# Patient Record
Sex: FEMALE | Race: OTHER | HISPANIC OR LATINO | Employment: PART TIME | URBAN - METROPOLITAN AREA
[De-identification: names, ages, dates, MRNs, and addresses within clinical notes are randomized per-mention and may not be internally consistent; named-entity substitution may affect disease eponyms.]

---
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
## 2023-04-15 ENCOUNTER — HOSPITAL ENCOUNTER (EMERGENCY)
Facility: HOSPITAL | Age: 18
Discharge: HOME | End: 2023-04-15
Attending: EMERGENCY MEDICINE
Payer: MEDICAID

## 2023-04-15 VITALS
BODY MASS INDEX: 34.32 KG/M2 | HEART RATE: 98 BPM | HEIGHT: 65 IN | OXYGEN SATURATION: 99 % | SYSTOLIC BLOOD PRESSURE: 133 MMHG | TEMPERATURE: 98 F | RESPIRATION RATE: 18 BRPM | DIASTOLIC BLOOD PRESSURE: 76 MMHG | WEIGHT: 206 LBS

## 2023-04-15 DIAGNOSIS — N89.8 VAGINAL DISCHARGE: ICD-10-CM

## 2023-04-15 DIAGNOSIS — Z20.2 POSSIBLE EXPOSURE TO STD: Primary | ICD-10-CM

## 2023-04-15 LAB
APPEARANCE UR: CLEAR
B-HCG SERPL QL: NEGATIVE
BACTERIA #/AREA URNS AUTO: ABNORMAL /HPF
BILIRUB UR QL STRIP.AUTO: NEGATIVE MG/DL
C TRACH DNA SPEC QL NAA+PROBE: NOT DETECTED
CLUE CELLS VAG QL WET PREP: ABNORMAL
COLOR UR AUTO: YELLOW
GLUCOSE UR QL STRIP.AUTO: NEGATIVE MG/DL
KETONES UR QL STRIP.AUTO: NEGATIVE MG/DL
LEUKOCYTE ESTERASE UR QL STRIP.AUTO: ABNORMAL UNIT/L
N GONORRHOEA DNA SPEC QL NAA+PROBE: NOT DETECTED
NITRITE UR QL STRIP.AUTO: NEGATIVE
PH UR STRIP.AUTO: 6.5 [PH]
PROT UR QL STRIP.AUTO: NEGATIVE MG/DL
RBC #/AREA URNS AUTO: <5 /HPF
RBC UR QL AUTO: NEGATIVE UNIT/L
SP GR UR STRIP.AUTO: 1.02 (ref 1–1.03)
SQUAMOUS #/AREA URNS AUTO: <5 /HPF
T VAGINALIS VAG QL WET PREP: ABNORMAL
UROBILINOGEN UR STRIP-ACNC: 1 MG/DL
WBC #/AREA URNS AUTO: <5 /HPF
WBC #/AREA VAG WET PREP: ABNORMAL
YEAST SPEC QL WET PREP: ABNORMAL

## 2023-04-15 PROCEDURE — 25000003 PHARM REV CODE 250

## 2023-04-15 PROCEDURE — 96372 THER/PROPH/DIAG INJ SC/IM: CPT | Performed by: NURSE PRACTITIONER

## 2023-04-15 PROCEDURE — 87591 N.GONORRHOEAE DNA AMP PROB: CPT | Performed by: NURSE PRACTITIONER

## 2023-04-15 PROCEDURE — A4216 STERILE WATER/SALINE, 10 ML: HCPCS

## 2023-04-15 PROCEDURE — 81001 URINALYSIS AUTO W/SCOPE: CPT | Performed by: EMERGENCY MEDICINE

## 2023-04-15 PROCEDURE — 99284 EMERGENCY DEPT VISIT MOD MDM: CPT

## 2023-04-15 PROCEDURE — 87210 SMEAR WET MOUNT SALINE/INK: CPT | Performed by: NURSE PRACTITIONER

## 2023-04-15 PROCEDURE — 63600175 PHARM REV CODE 636 W HCPCS: Performed by: NURSE PRACTITIONER

## 2023-04-15 PROCEDURE — 81025 URINE PREGNANCY TEST: CPT | Performed by: EMERGENCY MEDICINE

## 2023-04-15 RX ORDER — METRONIDAZOLE 500 MG/1
500 TABLET ORAL EVERY 12 HOURS
Qty: 28 TABLET | Refills: 0 | Status: SHIPPED | OUTPATIENT
Start: 2023-04-15 | End: 2023-04-29

## 2023-04-15 RX ORDER — WATER FOR INJECTION,STERILE
VIAL (ML) INJECTION
Status: COMPLETED
Start: 2023-04-15 | End: 2023-04-15

## 2023-04-15 RX ORDER — CEFTRIAXONE 1 G/1
0.5 INJECTION, POWDER, FOR SOLUTION INTRAMUSCULAR; INTRAVENOUS
Status: COMPLETED | OUTPATIENT
Start: 2023-04-15 | End: 2023-04-15

## 2023-04-15 RX ORDER — DOXYCYCLINE 100 MG/1
100 CAPSULE ORAL 2 TIMES DAILY
Qty: 28 CAPSULE | Refills: 0 | Status: SHIPPED | OUTPATIENT
Start: 2023-04-15 | End: 2023-04-29

## 2023-04-15 RX ADMIN — CEFTRIAXONE SODIUM 0.5 G: 1 INJECTION, POWDER, FOR SOLUTION INTRAMUSCULAR; INTRAVENOUS at 11:04

## 2023-04-15 RX ADMIN — WATER: 1 INJECTION INTRAMUSCULAR; INTRAVENOUS; SUBCUTANEOUS at 11:04

## 2023-04-15 NOTE — DISCHARGE INSTRUCTIONS
Doxycycline and flagyl for 14 days to treat vaginal/pelvic infection. Refrain from alcohol while on flagyl. Refrain from sexual activity until completed antibiotics

## 2023-04-15 NOTE — ED PROVIDER NOTES
Encounter Date: 4/15/2023       History     Chief Complaint   Patient presents with    Vaginal Discharge     Pt presents to er c/o viginal discharge for last two days.  States irritation when washing self.  Denies any lesion to vaginal area.  States yellowish discharge.      18 y/o female who presents with vaginal discharge and irritation for the last 2 days. States she had an odor as well. She is concerned for STD.     The history is provided by the patient. No  was used.   Vaginal Discharge  This is a new problem. The current episode started 2 days ago.   Review of patient's allergies indicates:  No Known Allergies  No past medical history on file.  No past surgical history on file.  No family history on file.     Review of Systems   Genitourinary:  Positive for vaginal discharge.   All other systems reviewed and are negative.    Physical Exam     Initial Vitals   BP Pulse Resp Temp SpO2   04/15/23 0734 04/15/23 0734 04/15/23 0758 04/15/23 0734 04/15/23 0734   133/76 98 18 97.9 °F (36.6 °C) 99 %      MAP       --                Physical Exam    Nursing note and vitals reviewed.  Constitutional: She appears well-developed and well-nourished.   Cardiovascular:  Normal rate, regular rhythm and normal heart sounds.           Pulmonary/Chest: Breath sounds normal. No respiratory distress.   Abdominal: Abdomen is soft. She exhibits no distension. There is no abdominal tenderness.   obese   Genitourinary: There is no rash or tenderness on the right labia. There is no rash or tenderness on the left labia. Cervix exhibits discharge (yellow) and friability. Right adnexum displays no tenderness. Left adnexum displays tenderness.    Vaginal discharge (yellow) present.       Neurological: She is alert and oriented to person, place, and time.   Skin: Skin is warm and dry.   Psychiatric: She has a normal mood and affect.       ED Course   Procedures  Labs Reviewed   WET PREP, GENITAL - Abnormal; Notable for  the following components:       Result Value    WBC, Wet Prep Many (*)     All other components within normal limits   URINALYSIS, REFLEX TO URINE CULTURE - Abnormal; Notable for the following components:    Leukocyte Esterase, UA Trace (*)     All other components within normal limits   URINALYSIS, MICROSCOPIC - Abnormal; Notable for the following components:    Bacteria, UA 1+ (*)     All other components within normal limits   CHLAMYDIA/GONORRHOEAE(GC), PCR - Normal    Narrative:     The Xpert CT/NG test, performed on the GeneXpert system is a qualitative in vitro real-time polymerase chain reaction (PCR) test for the automated detected and differentiation for genomic DNA from Chlamydia trachomatis (CT) and/or Neisseria gonorrhoeae (NG).   PREGNANCY TEST, URINE RAPID - Normal          Imaging Results    None          Medications   cefTRIAXone injection 0.5 g (0.5 g Intramuscular Given 4/15/23 1155)   sterile water for injection injection (  Given 4/15/23 1155)     Medical Decision Making:   Differential Diagnosis:   Includes but not limited to UTI, gonorrhea, chlamydia, PID  Clinical Tests:   Lab Tests: Ordered and Reviewed  The following lab test(s) were unremarkable: Urinalysis and UPT       <> Summary of Lab: GC neg. Trich neg. No clue cells. Wbc in swab. UA with bacteria.   ED Management:  18 y/o female who presents with vaginal discharge, odor. Vaginal exam she has pain on left adnexa, yellow discharge, cervix is friable. Swabs neg for acute findings. Will treat for PID with rocephin and doxy and flagyl.                         Clinical Impression:   Final diagnoses:  [Z20.2] Possible exposure to STD (Primary)  [N89.8] Vaginal discharge        ED Disposition Condition    Discharge Stable          ED Prescriptions       Medication Sig Dispense Start Date End Date Auth. Provider    doxycycline (VIBRAMYCIN) 100 MG Cap Take 1 capsule (100 mg total) by mouth 2 (two) times daily. for 14 days 28 capsule 4/15/2023  4/29/2023 JANETTE Gupta    metroNIDAZOLE (FLAGYL) 500 MG tablet Take 1 tablet (500 mg total) by mouth every 12 (twelve) hours. for 14 days 28 tablet 4/15/2023 4/29/2023 JANETTE Gupta          Follow-up Information       Follow up With Specialties Details Why Contact Info    obgyn and/or [facility] unit  Call in 1 week               JANETTE Gupta  04/15/23 6227

## 2023-05-03 ENCOUNTER — HOSPITAL ENCOUNTER (EMERGENCY)
Facility: HOSPITAL | Age: 18
Discharge: HOME | End: 2023-05-03
Attending: STUDENT IN AN ORGANIZED HEALTH CARE EDUCATION/TRAINING PROGRAM
Payer: MEDICAID

## 2023-05-03 VITALS
WEIGHT: 260 LBS | DIASTOLIC BLOOD PRESSURE: 90 MMHG | TEMPERATURE: 99 F | HEIGHT: 65 IN | BODY MASS INDEX: 43.32 KG/M2 | OXYGEN SATURATION: 99 % | HEART RATE: 105 BPM | SYSTOLIC BLOOD PRESSURE: 155 MMHG | RESPIRATION RATE: 18 BRPM

## 2023-05-03 DIAGNOSIS — S39.012A LUMBAR STRAIN, INITIAL ENCOUNTER: Primary | ICD-10-CM

## 2023-05-03 DIAGNOSIS — S16.1XXA CERVICAL STRAIN, ACUTE, INITIAL ENCOUNTER: ICD-10-CM

## 2023-05-03 LAB — B-HCG SERPL QL: NEGATIVE

## 2023-05-03 PROCEDURE — 81025 URINE PREGNANCY TEST: CPT | Performed by: NURSE PRACTITIONER

## 2023-05-03 PROCEDURE — 25000003 PHARM REV CODE 250: Performed by: NURSE PRACTITIONER

## 2023-05-03 PROCEDURE — 99284 EMERGENCY DEPT VISIT MOD MDM: CPT

## 2023-05-03 RX ORDER — ORPHENADRINE CITRATE 100 MG/1
100 TABLET, EXTENDED RELEASE ORAL 2 TIMES DAILY
Qty: 20 TABLET | Refills: 0 | Status: SHIPPED | OUTPATIENT
Start: 2023-05-03 | End: 2023-05-13

## 2023-05-03 RX ADMIN — IBUPROFEN 800 MG: 200 TABLET, FILM COATED ORAL at 05:05
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
## 2023-09-09 ENCOUNTER — HOSPITAL ENCOUNTER (EMERGENCY)
Facility: HOSPITAL | Age: 18
Discharge: HOME | End: 2023-09-09
Payer: MEDICAID

## 2023-09-09 DIAGNOSIS — N76.1 SUBACUTE VAGINITIS: ICD-10-CM

## 2023-09-09 DIAGNOSIS — L60.0 INGROWN NAIL OF THIRD TOE OF RIGHT FOOT: Primary | ICD-10-CM

## 2023-09-09 PROCEDURE — 99284 EMERGENCY DEPT VISIT MOD MDM: CPT

## 2023-09-09 RX ORDER — CEPHALEXIN 500 MG/1
500 CAPSULE ORAL 4 TIMES DAILY
Qty: 20 CAPSULE | Refills: 0 | Status: SHIPPED | OUTPATIENT
Start: 2023-09-09 | End: 2023-09-14

## 2023-09-09 RX ORDER — MUPIROCIN 20 MG/G
OINTMENT TOPICAL 3 TIMES DAILY
Qty: 1 G | Refills: 0 | Status: SHIPPED | OUTPATIENT
Start: 2023-09-09 | End: 2023-09-16

## 2023-09-09 NOTE — FIRST PROVIDER EVALUATION
Medical screening examination initiated.  I have conducted a focused provider triage encounter, findings are as follows:    Brief history of present illness:  19y/o F presents to the ED with right foot toe pain. Onset 1 day. She also reports vaginal irritation x 1 month. States seen PCP , OB along with multiple  ED visits  in the past with no relief of symptoms.     There were no vitals filed for this visit.    Pertinent physical exam:  AAA x 3    Brief workup plan: SHELLEY evaluation     Preliminary workup initiated; this workup will be continued and followed by the physician or advanced practice provider that is assigned to the patient when roomed.

## 2023-09-09 NOTE — ED PROVIDER NOTES
Encounter Date: 9/9/2023       History     Chief Complaint   Patient presents with    Toe Pain     Patient reports with 3rd toe pain with redness, states not hitting toe or injuring it recently.      19y/o F presents to the ED with toe pain noted to right foot onset 1 day. Reports discoloration noted to the 3rd toe that resembles bruising . Denies any injury to right toe. She also reports vaginal irritation for the past month. She states she was seeing at multiple emergency rooms, walk in clinics and followed up with her GYN where they performed a pap smear. She reports was given creams and antibiotics with no relief. She reports taking a probiotic daily for her vaginal irritation    The history is provided by the patient.   Foot Injury   There was no injury mechanism. The incident occurred yesterday. The pain is present in the right toes. The quality of the pain is described as aching. She reports no foreign bodies present. Nothing aggravates the symptoms. The treatment provided no relief.   Female  Problem  Primary symptoms include genital itching.   This is a recurrent problem. The current episode started several weeks ago. The problem occurs throughout the day. The problem has been unchanged.     Review of patient's allergies indicates:  No Known Allergies  Past Medical History:   Diagnosis Date    Known health problems: none      Past Surgical History:   Procedure Laterality Date    none       No family history on file.  Social History     Tobacco Use    Smoking status: Never    Smokeless tobacco: Never   Substance Use Topics    Alcohol use: Not Currently    Drug use: Never     Review of Systems   All other systems reviewed and are negative.      Physical Exam     Initial Vitals   BP Pulse Resp Temp SpO2   -- -- -- -- --      MAP       --         Physical Exam    Constitutional: She appears well-developed and well-nourished.   HENT:   Head: Normocephalic and atraumatic.   Eyes: EOM are normal. Pupils are  equal, round, and reactive to light.   Neck: Neck supple.   Normal range of motion.  Cardiovascular:  Normal rate, regular rhythm and normal heart sounds.           Pulmonary/Chest: Breath sounds normal.   Musculoskeletal:      Cervical back: Normal range of motion and neck supple.     Skin: Capillary refill takes less than 2 seconds. There is erythema (right third toe erythema and drainage noted).         ED Course   Procedures  Labs Reviewed - No data to display       Imaging Results    None          Medications - No data to display  Medical Decision Making  Discuss with patient since the vaginal complaint has been a a ongoing issue she has been advise to follow up with GYN. She denies any urinary symptoms today or change in vaginal irritation. Advise to continue to taker her probiotic OTC. Recommend soak epsom salt BID and apply medications as directed. Prior to discharge all questions have been answered.     Amount and/or Complexity of Data Reviewed  External Data Reviewed: notes.    Risk  Prescription drug management.    Critical Care  Total time providing critical care: minutes (NONE)                               Clinical Impression:   Final diagnoses:  [L60.0] Ingrown nail of third toe of right foot (Primary)  [N76.1] Subacute vaginitis        ED Disposition Condition    Discharge Stable          ED Prescriptions       Medication Sig Dispense Start Date End Date Auth. Provider    mupirocin (BACTROBAN) 2 % ointment Apply topically 3 (three) times daily. for 7 days 1 g 9/9/2023 9/16/2023 Leny Erickson FNP    cephALEXin (KEFLEX) 500 MG capsule Take 1 capsule (500 mg total) by mouth 4 (four) times daily. for 5 days 20 capsule 9/9/2023 9/14/2023 Leny Erickson FNP          Follow-up Information       Follow up With Specialties Details Why Contact Info    Kaylie Tsai NP Family Medicine   [address] [facility]  [facility] [address]  [phone]               Georgina  Leny RENDON, [facility]  09/09/23 1517

## 2023-09-09 NOTE — Clinical Note
"Louisa Shafer" Sathish was seen and treated in our emergency department on 9/9/2023.  She may return to work on 09/11/2023.       If you have any questions or concerns, please don't hesitate to call.      Leny Erickson, JHONNYP"

## 2023-09-09 NOTE — Clinical Note
"Louisa Shafer" Sathish was seen and treated in our emergency department on 9/9/2023.  She may return to work on 09/11/2023.       If you have any questions or concerns, please don't hesitate to call.      Chanell Noguera, Patient Care Assistant"

## 2025-06-08 ENCOUNTER — HOSPITAL ENCOUNTER (EMERGENCY)
Facility: HOSPITAL | Age: 20
Discharge: HOME | End: 2025-06-08
Attending: EMERGENCY MEDICINE
Payer: MEDICAID

## 2025-06-08 VITALS
HEIGHT: 65 IN | HEART RATE: 80 BPM | DIASTOLIC BLOOD PRESSURE: 85 MMHG | OXYGEN SATURATION: 99 % | SYSTOLIC BLOOD PRESSURE: 126 MMHG | WEIGHT: 264.63 LBS | BODY MASS INDEX: 44.09 KG/M2 | TEMPERATURE: 98 F | RESPIRATION RATE: 18 BRPM

## 2025-06-08 DIAGNOSIS — K08.89 PAIN, DENTAL: Primary | ICD-10-CM

## 2025-06-08 PROCEDURE — 99284 EMERGENCY DEPT VISIT MOD MDM: CPT

## 2025-06-08 PROCEDURE — 25000003 PHARM REV CODE 250: Performed by: EMERGENCY MEDICINE

## 2025-06-08 RX ORDER — ACETAMINOPHEN 500 MG
1000 TABLET ORAL
Status: COMPLETED | OUTPATIENT
Start: 2025-06-08 | End: 2025-06-08

## 2025-06-08 RX ORDER — NAPROXEN 500 MG/1
500 TABLET ORAL 2 TIMES DAILY PRN
Qty: 20 TABLET | Refills: 1 | Status: SHIPPED | OUTPATIENT
Start: 2025-06-08

## 2025-06-08 RX ORDER — NAPROXEN 250 MG/1
500 TABLET ORAL
Status: COMPLETED | OUTPATIENT
Start: 2025-06-08 | End: 2025-06-08

## 2025-06-08 RX ORDER — AMOXICILLIN AND CLAVULANATE POTASSIUM 875; 125 MG/1; MG/1
1 TABLET, FILM COATED ORAL
Status: COMPLETED | OUTPATIENT
Start: 2025-06-08 | End: 2025-06-08

## 2025-06-08 RX ORDER — AMOXICILLIN 500 MG/1
500 CAPSULE ORAL 3 TIMES DAILY
Qty: 21 CAPSULE | Refills: 0 | Status: SHIPPED | OUTPATIENT
Start: 2025-06-08 | End: 2025-06-15

## 2025-06-08 RX ADMIN — ACETAMINOPHEN 1000 MG: 500 TABLET ORAL at 05:06

## 2025-06-08 RX ADMIN — NAPROXEN 500 MG: 250 TABLET ORAL at 05:06

## 2025-06-08 RX ADMIN — AMOXICILLIN AND CLAVULANATE POTASSIUM 1 TABLET: 875; 125 TABLET, FILM COATED ORAL at 05:06

## 2025-06-08 NOTE — ED PROVIDER NOTES
Encounter Date: 6/8/2025       History     Chief Complaint   Patient presents with    Dental Problem     States right upper and lower wisdom teeth issues.  Pain and swelling.  Dental list given in Triage.       Needs all 4 dental extractions for wisdom teeth, working on an dentist or oral surgeon.  Pain in the right lower wisdom tooth, denies chance of pregnancy, no recent antibiotics, feels that the area might be getting infected.  No other complaints.    The history is provided by the patient. No  was used.     Review of patient's allergies indicates:  No Known Allergies  Past Medical History:   Diagnosis Date    Known health problems: none      Past Surgical History:   Procedure Laterality Date    none       No family history on file.  Social History[1]  Review of Systems   HENT:  Positive for dental problem.        Physical Exam     Initial Vitals [06/08/25 0439]   BP Pulse Resp Temp SpO2   128/75 86 17 99.8 °F (37.7 °C) 99 %      MAP       --         Physical Exam    Nursing note and vitals reviewed.  Constitutional: She appears well-developed and well-nourished. No distress.   HENT:   Head: Normocephalic and atraumatic.   All 4 wisdom teeth impacted, tender right lower, none with obvious inflammation, swelling, exudate, or other findings.  Generally good dental repair.   Eyes: EOM are normal. Pupils are equal, round, and reactive to light.   Pulmonary/Chest: Breath sounds normal. No respiratory distress.           ED Course   Procedures  Labs Reviewed - No data to display       Imaging Results    None          Medications   amoxicillin-clavulanate 875-125mg per tablet 1 tablet (1 tablet Oral Given 6/8/25 0508)   naproxen tablet 500 mg (500 mg Oral Given 6/8/25 0508)   acetaminophen tablet 1,000 mg (1,000 mg Oral Given 6/8/25 0508)     Medical Decision Making  Sore, impacted right lower wisdom tooth, needs all 4 extracted.  Given dental and oral surgery resources.    Problems  Addressed:  Pain, dental: acute illness or injury    Risk  OTC drugs.  Prescription drug management.      Additional MDM:   Differential Diagnosis:   Dental abscess, impacted wisdom teeth, dental caries among others                                    Clinical Impression:  Final diagnoses:  [K08.89] Pain, dental (Primary)          ED Disposition Condition    Discharge Stable          ED Prescriptions       Medication Sig Dispense Start Date End Date Auth. Provider    amoxicillin (AMOXIL) 500 MG capsule Take 1 capsule (500 mg total) by mouth 3 (three) times daily. for 7 days 21 capsule 6/8/2025 6/15/2025 Anthony Napoles MD    naproxen (NAPROSYN) 500 MG tablet Take 1 tablet (500 mg total) by mouth 2 (two) times daily as needed. 20 tablet 6/8/2025 -- Anthony Napoles MD          Follow-up Information       Follow up With Specialties Details Why Contact Info    [facility] Dept Emergency Medicine  As needed [address] [facility] [address] [phone]                   [1]   Social History  Tobacco Use    Smoking status: Never    Smokeless tobacco: Never   Vaping Use    Vaping status: Every Day   Substance Use Topics    Alcohol use: Not Currently    Drug use: Never        Anthony Napoles MD  06/08/25 1697

## 2025-06-23 ENCOUNTER — HOSPITAL ENCOUNTER (EMERGENCY)
Facility: HOSPITAL | Age: 20
Discharge: HOME | End: 2025-06-23
Attending: FAMILY MEDICINE
Payer: MEDICAID

## 2025-06-23 VITALS
SYSTOLIC BLOOD PRESSURE: 131 MMHG | TEMPERATURE: 98 F | DIASTOLIC BLOOD PRESSURE: 75 MMHG | BODY MASS INDEX: 43.4 KG/M2 | RESPIRATION RATE: 20 BRPM | OXYGEN SATURATION: 100 % | WEIGHT: 260.5 LBS | HEART RATE: 74 BPM

## 2025-06-23 DIAGNOSIS — O36.80X0 PREGNANCY OF UNKNOWN ANATOMIC LOCATION: Primary | ICD-10-CM

## 2025-06-23 DIAGNOSIS — R10.9 ABDOMINAL CRAMPING: ICD-10-CM

## 2025-06-23 DIAGNOSIS — N39.0 URINARY TRACT INFECTION WITHOUT HEMATURIA, SITE UNSPECIFIED: Primary | ICD-10-CM

## 2025-06-23 LAB
ALBUMIN SERPL-MCNC: 4.2 G/DL (ref 3.5–5)
ALBUMIN/GLOB SERPL: 1.2 RATIO (ref 1.1–2)
ALP SERPL-CCNC: 60 UNIT/L (ref 40–150)
ALT SERPL-CCNC: 11 UNIT/L (ref 0–55)
ANION GAP SERPL CALC-SCNC: 10 MEQ/L
AST SERPL-CCNC: 13 UNIT/L (ref 11–45)
B-HCG FREE SERPL-ACNC: 1228.23 MIU/ML
B-HCG UR QL: POSITIVE
BACTERIA #/AREA URNS AUTO: ABNORMAL /HPF
BASOPHILS # BLD AUTO: 0.05 X10(3)/MCL
BASOPHILS NFR BLD AUTO: 0.6 %
BILIRUB SERPL-MCNC: 0.6 MG/DL
BILIRUB UR QL STRIP.AUTO: NEGATIVE
BUN SERPL-MCNC: 10.5 MG/DL (ref 7–18.7)
CALCIUM SERPL-MCNC: 9.2 MG/DL (ref 8.4–10.2)
CHLORIDE SERPL-SCNC: 109 MMOL/L (ref 98–107)
CLARITY UR: ABNORMAL
CO2 SERPL-SCNC: 22 MMOL/L (ref 22–29)
COLOR UR AUTO: YELLOW
CREAT SERPL-MCNC: 0.83 MG/DL (ref 0.55–1.02)
CREAT/UREA NIT SERPL: 13
CTP QC/QA: YES
EOSINOPHIL # BLD AUTO: 0.06 X10(3)/MCL (ref 0–0.9)
EOSINOPHIL NFR BLD AUTO: 0.7 %
ERYTHROCYTE [DISTWIDTH] IN BLOOD BY AUTOMATED COUNT: 12.3 % (ref 11.5–17)
GFR SERPLBLD CREATININE-BSD FMLA CKD-EPI: >60 ML/MIN/1.73/M2
GLOBULIN SER-MCNC: 3.4 GM/DL (ref 2.4–3.5)
GLUCOSE SERPL-MCNC: 91 MG/DL (ref 74–100)
GLUCOSE UR QL STRIP: NORMAL
HCT VFR BLD AUTO: 39.7 % (ref 37–47)
HGB BLD-MCNC: 12.9 G/DL (ref 12–16)
HGB UR QL STRIP: NEGATIVE
HOLD SPECIMEN: NORMAL
HYALINE CASTS #/AREA URNS LPF: ABNORMAL /LPF
IMM GRANULOCYTES # BLD AUTO: 0.02 X10(3)/MCL (ref 0–0.04)
IMM GRANULOCYTES NFR BLD AUTO: 0.2 %
KETONES UR QL STRIP: NEGATIVE
LEUKOCYTE ESTERASE UR QL STRIP: 500
LIPASE SERPL-CCNC: 24 U/L
LYMPHOCYTES # BLD AUTO: 1.83 X10(3)/MCL (ref 0.6–4.6)
LYMPHOCYTES NFR BLD AUTO: 21.2 %
MCH RBC QN AUTO: 28.2 PG (ref 27–31)
MCHC RBC AUTO-ENTMCNC: 32.5 G/DL (ref 33–36)
MCV RBC AUTO: 86.7 FL (ref 80–94)
MONOCYTES # BLD AUTO: 0.59 X10(3)/MCL (ref 0.1–1.3)
MONOCYTES NFR BLD AUTO: 6.8 %
MUCOUS THREADS URNS QL MICRO: ABNORMAL /LPF
NEUTROPHILS # BLD AUTO: 6.07 X10(3)/MCL (ref 2.1–9.2)
NEUTROPHILS NFR BLD AUTO: 70.5 %
NITRITE UR QL STRIP: NEGATIVE
NRBC BLD AUTO-RTO: 0 %
PH UR STRIP: 6 [PH]
PLATELET # BLD AUTO: 313 X10(3)/MCL (ref 130–400)
PMV BLD AUTO: 9.4 FL (ref 7.4–10.4)
POTASSIUM SERPL-SCNC: 3.8 MMOL/L (ref 3.5–5.1)
PROT SERPL-MCNC: 7.6 GM/DL (ref 6.4–8.3)
PROT UR QL STRIP: ABNORMAL
RBC # BLD AUTO: 4.58 X10(6)/MCL (ref 4.2–5.4)
RBC #/AREA URNS AUTO: ABNORMAL /HPF
SODIUM SERPL-SCNC: 141 MMOL/L (ref 136–145)
SP GR UR STRIP.AUTO: 1.03 (ref 1–1.03)
SQUAMOUS #/AREA URNS LPF: ABNORMAL /HPF
UROBILINOGEN UR STRIP-ACNC: NORMAL
WBC # BLD AUTO: 8.62 X10(3)/MCL (ref 4.5–11.5)
WBC #/AREA URNS AUTO: ABNORMAL /HPF

## 2025-06-23 PROCEDURE — 87086 URINE CULTURE/COLONY COUNT: CPT

## 2025-06-23 PROCEDURE — 99284 EMERGENCY DEPT VISIT MOD MDM: CPT | Mod: 25

## 2025-06-23 PROCEDURE — 81025 URINE PREGNANCY TEST: CPT

## 2025-06-23 PROCEDURE — 80053 COMPREHEN METABOLIC PANEL: CPT

## 2025-06-23 PROCEDURE — 84702 CHORIONIC GONADOTROPIN TEST: CPT

## 2025-06-23 PROCEDURE — 85025 COMPLETE CBC W/AUTO DIFF WBC: CPT

## 2025-06-23 PROCEDURE — 83690 ASSAY OF LIPASE: CPT

## 2025-06-23 PROCEDURE — 81001 URINALYSIS AUTO W/SCOPE: CPT

## 2025-06-23 RX ORDER — NITROFURANTOIN 25; 75 MG/1; MG/1
100 CAPSULE ORAL 2 TIMES DAILY
Qty: 10 CAPSULE | Refills: 0 | Status: SHIPPED | OUTPATIENT
Start: 2025-06-23 | End: 2025-06-28

## 2025-06-23 NOTE — ED PROVIDER NOTES
Encounter Date: 6/23/2025       History     Chief Complaint   Patient presents with    Abdominal Pain     5 weeks pregnant with abdominal pain and cramping as well as low back pain     19-year-old female who presents with complaints of pelvic cramping onset over the past few days.  She is roughly 5 weeks pregnant.  Denies fever, body aches, chills, dysuria, hematuria, vaginal bleeding or discharge, nausea, vomiting, diarrhea, urinary or bowel changes.    The history is provided by the patient.     Review of patient's allergies indicates:  No Known Allergies  Past Medical History:   Diagnosis Date    Known health problems: none      Past Surgical History:   Procedure Laterality Date    none       No family history on file.  Social History[1]  Review of Systems   Constitutional:  Negative for fever.   HENT:  Negative for sore throat.    Respiratory:  Negative for shortness of breath.    Cardiovascular:  Negative for chest pain.   Gastrointestinal:  Negative for nausea.   Genitourinary:  Positive for pelvic pain. Negative for dysuria.   Musculoskeletal:  Negative for back pain.   Skin:  Negative for rash.   Neurological:  Negative for weakness.   Hematological:  Does not bruise/bleed easily.       Physical Exam     Initial Vitals [06/23/25 1600]   BP Pulse Resp Temp SpO2   (!) 145/83 83 20 98.3 °F (36.8 °C) 99 %      MAP       --         Physical Exam    Nursing note and vitals reviewed.  Constitutional: She appears well-developed and well-nourished. She is not diaphoretic. No distress.   HENT:   Head: Normocephalic and atraumatic.   Right Ear: External ear normal.   Left Ear: External ear normal.   Nose: Nose normal. Mouth/Throat: Oropharynx is clear and moist.   Eyes: Conjunctivae and EOM are normal. Pupils are equal, round, and reactive to light. Right eye exhibits no discharge. Left eye exhibits no discharge.   Neck: Neck supple.   Normal range of motion.  Cardiovascular:  Normal rate.           Pulmonary/Chest:  Breath sounds normal. No respiratory distress. She has no wheezes.   Abdominal: Abdomen is soft. Bowel sounds are normal. She exhibits no distension and no mass. There is abdominal tenderness (nonspecific lower abdominal/pelvic tenderness).   No right CVA tenderness.  No left CVA tenderness. There is no rebound, no guarding and negative Yi's sign. negative Rovsing's sign  Musculoskeletal:         General: Normal range of motion.      Cervical back: Normal range of motion and neck supple.     Neurological: She is alert and oriented to person, place, and time. No cranial nerve deficit or sensory deficit. GCS score is 15. GCS eye subscore is 4. GCS verbal subscore is 5. GCS motor subscore is 6.   Skin: Skin is warm. Capillary refill takes less than 2 seconds.   Psychiatric: She has a normal mood and affect. Thought content normal.         ED Course   Procedures  Labs Reviewed   COMPREHENSIVE METABOLIC PANEL - Abnormal       Result Value    Sodium 141      Potassium 3.8      Chloride 109 (*)     CO2 22      Glucose 91      Blood Urea Nitrogen 10.5      Creatinine 0.83      Calcium 9.2      Protein Total 7.6      Albumin 4.2      Globulin 3.4      Albumin/Globulin Ratio 1.2      Bilirubin Total 0.6      ALP 60      ALT 11      AST 13      eGFR >60      Anion Gap 10.0      BUN/Creatinine Ratio 13     URINALYSIS, REFLEX TO URINE CULTURE - Abnormal    Color, UA Yellow      Appearance, UA Turbid (*)     Specific Gravity, UA 1.031 (*)     pH, UA 6.0      Protein, UA Trace (*)     Glucose, UA Normal      Ketones, UA Negative      Blood, UA Negative      Bilirubin, UA Negative      Urobilinogen, UA Normal      Nitrites, UA Negative      Leukocyte Esterase,  (*)     RBC, UA 6-10 (*)     WBC, UA 11-20 (*)     Bacteria, UA None Seen      Squamous Epithelial Cells, UA Many (*)     Mucous, UA Occasional (*)     Hyaline Casts, UA None Seen     HCG, QUANTITATIVE - Abnormal    Beta HCG Quant 1,228.23 (*)    CBC WITH  DIFFERENTIAL - Abnormal    WBC 8.62      RBC 4.58      Hgb 12.9      Hct 39.7      MCV 86.7      MCH 28.2      MCHC 32.5 (*)     RDW 12.3      Platelet 313      MPV 9.4      Neut % 70.5      Lymph % 21.2      Mono % 6.8      Eos % 0.7      Basophil % 0.6      Imm Grans % 0.2      Neut # 6.07      Lymph # 1.83      Mono # 0.59      Eos # 0.06      Baso # 0.05      Imm Gran # 0.02      NRBC% 0.0     POCT URINE PREGNANCY - Abnormal    POC Preg Test, Ur Positive (*)      Acceptable Yes     LIPASE - Normal    Lipase Level 24     CULTURE, URINE   CBC W/ AUTO DIFFERENTIAL    Narrative:     The following orders were created for panel order CBC auto differential.  Procedure                               Abnormality         Status                     ---------                               -----------         ------                     CBC with Differential[[id number]]        Abnormal            Final result                 Please view results for these tests on the individual orders.   EXTRA TUBES    Narrative:     The following orders were created for panel order EXTRA TUBES.  Procedure                               Abnormality         Status                     ---------                               -----------         ------                     Light Blue Top Hold[[id number]]                              Final result               Red Top Hold[[id number]]                                     Final result               Gold Top Hold[[id number]]                                    Final result                 Please view results for these tests on the individual orders.   LIGHT BLUE TOP HOLD    Extra Tube Hold for add-ons.     RED TOP HOLD    Extra Tube Hold for add-ons.     GOLD TOP HOLD    Extra Tube Hold for add-ons.            Imaging Results              US OB <14 Wks, TransAbd, Single Gestation (Preliminary result)  Result time 06/23/25 21:23:54      Preliminary result by Greg Borjas MD (06/23/25  21:23:54)                   Narrative:    START OF REPORT:  Technique: First trimester ultrasound of the pelvis was performed with transabdominal and transvaginal images being obtained.    Comparison: None.    Clinical history: Dx: Abdominal cramping [R10.9 (ICD-10-CM)].    FINDINGS:  Uterus: The uterus measures 7.1 cm in sagittal dimension by 4.7 cm in transverse dimension by 4.8 cm in AP dimension. The endometrium appears homogenous and markedly thickened.  Intrauterine gestation: No intrauterine gestation is identified with no intrauterine gestational sac yolk sac or fetal pole identified on the submitted images.    Adnexa:  Right Ovary: There is a thick walled cystic structure measuring 4.8 x 5.2 x 3.9 cm with internal echoes, calcific components, heterogenous material, and ring like structure within the right ovary. This is suggestive of a large possibly atypical cyst with the possibility of a right adnexal infectious component not excluded.  Left Ovary: The left ovary measures 3.6 cm by 2.4 cm by 2.7 cm. A large likely corpus luteum cyst is seen which measures 2.4 x 2.1 x 2.1 cm.    Fluid: Minimal free fluid is seen in the cul de sac.    Notification: The results were discussed prior to dictation referring resident OB physician at 2025-06-23 21:22:59 CDT.      Impression:  1. No intrauterine gestation is identified with no intrauterine gestational sac yolk sac or fetal pole identified on the submitted images.  2. There is a thick walled cystic structure measuring 4.8 x 5.2 x 3.9 cm with internal echoes, calcific components, heterogenous material, and ring like structure within the right ovary. This is suggestive of a large possibly atypical cyst with the possibility of a right adnexal infectious component not excluded.  3. Recommend continued serial clinical laboratory and ultrasound follow-up.                                         Medications - No data to display  Medical Decision Making  Differential  diagnosis:   UTI   Ectopic pregnancy     Amount and/or Complexity of Data Reviewed  Labs: ordered.  Radiology: ordered.    Risk  Prescription drug management.               ED Course as of 06/23/25 2133 Mon Jun 23, 2025 1925 Discussed case with Gyn on call. They will present to evaluate the patient.  [DB]   2129 Gyn evl patient and rec repeat beta HCG in 48 hours. Patient was instructed of this and deemed stable for discharge.  [DB]   2130 Given strict ED return precautions. I have spoken with the patient and/or caregivers. I have explained the patient's condition, diagnoses and treatment plan based on the information available to me at this time. I have answered the patient's and/or caregiver's questions and addressed any concerns. The patient and/or caregivers have as good an understanding of the patient's diagnosis, condition and treatment plan as can be expected at this point. The vital signs have been stable. The patient's condition is stable and appropriate for discharge from the emergency department.      The patient will pursue further outpatient evaluation with the primary care physician or other designated or consulting physician as outlined in the discharge instructions. The patient and/or caregivers are agreeable to this plan of care and follow-up instructions have been explained in detail. The patient and/or caregivers have received these instructions in written format and have expressed an understanding of the discharge instructions. The patient and/or caregivers are aware that any significant change in condition or worsening of symptoms should prompt an immediate return to this or the closest emergency department or a call to 911.    [DB]      ED Course User Index  [DB] Reji Duarte, KIA                           Clinical Impression:  Final diagnoses:  [R10.9] Abdominal cramping  [N39.0] Urinary tract infection without hematuria, site unspecified (Primary)          ED Disposition Condition     Discharge Stable          ED Prescriptions       Medication Sig Dispense Start Date End Date Auth. Provider    nitrofurantoin, macrocrystal-monohydrate, (MACROBID) 100 MG capsule Take 1 capsule (100 mg total) by mouth 2 (two) times daily. for 5 days 10 capsule 6/23/2025 6/28/2025 Reji Duarte PA-C          Follow-up Information       Follow up With Specialties Details Why Contact Info    [facility] Dept Emergency Medicine  If symptoms worsen [address] [facility] [address] [phone]                   [1]   Social History  Tobacco Use    Smoking status: Never    Smokeless tobacco: Never   Vaping Use    Vaping status: Every Day   Substance Use Topics    Alcohol use: Not Currently    Drug use: Never        Reji Duarte PA-C  06/23/25 3355

## 2025-06-24 NOTE — DISCHARGE INSTRUCTIONS
As discussed, please obtain repeat blood work in 48 hours. You may present to OBGYN clinic or return to ED for this.

## 2025-06-24 NOTE — CONSULTS
"[facility] OB/GYN CLINIC NOTE  [facility]  [address]  [place] [address]  Phone: [phone]  Fax: [phone]    [facility] Gynecology Consult - Resident Note    Consulting Physician: Reji Duarte PA-C  Reason for consult: Abdomina pain with pregnancy and atypical US findings   Subjective:      HPI:   Louisa Bower is an 19 y.o. year old  who has a past medical history of Known health problems: none. and presents with lower abdominal pain.     Today she reports that she was treated for gonorrhea and chlamydia on Friday at an outside clinic. Has had lower abdominal cramping since then and decided to come in for evaluation. States for GC/CT was treated with "a shot in her butt" and "4 pink pills". She denies any vaginal bleeding, urinary symptoms.     Review of systems  Negative unless noted in HPI    Past Medical History  Past Medical History:   Diagnosis Date    Known health problems: none      Past Surgical History  Past Surgical History:   Procedure Laterality Date    none       Obstetrical History  OB History    Para Term  AB Living   2 1 1 0 0 1   SAB IAB Ectopic Multiple Live Births             # Outcome Date GA Lbr Agustin/2nd Weight Sex Type Anes PTL Lv   2 Current            1 Term      Vag-Spont        Gynecologic History  Patient's last menstrual period was 2025 (exact date).  Menarche: /5-7  STD history: Positive for remote hx of GC/CT/trich all treated. Again positive got GC/CT last Friday and treated at outside facility.   Pap smear history: NA    Allergies  Review of patient's allergies indicates:  No Known Allergies    Family History  No family history on file.    Social History  Social History[1]    Overview of active problems  There are no active problems to display for this patient.    Medications  Home medications  Prescriptions Prior to Admission[2]    Current Inpatient medications  Current Medications[3]       Objective:     Vitals:    25 1600   BP: (!) 145/83 "   Pulse: 83   Resp: 20   Temp: 98.3 °F (36.8 °C)   SpO2: 99%   Weight: 118.2 kg (260 lb 8 oz)     Body mass index is 43.4 kg/m².    No intake/output data recorded.    Physical Exam:  General: alert and oriented, in no acute distress   Lungs: No increased work of breathing    Heart: regular rate   Abdomen: Soft, non-distended, minimal tenderness in lower abdomen diffusely to deep palpation   Bowel Sounds: Active   DVT Evaluation: No cords or calf tenderness.  No significant calf/ankle edema.   Extremities: Normal, atraumatic, non-edematous   External genitalia: Normal female genitalia without lesion, discharge or tenderness.   Bimanual Exam: No cervical motion tenderness. Tenderness in adnexa bilaterally on bimanual, mild. No palpable masses or fullness.    Speculum Exam: Vaginal vault without discharge, nonodorous, no lesions/masses seen.  Cervical os visualized as closed, no lesions/masses.   Note: RN chaperone present for entirety of exam.    Results:     LABS  Trended:  Recent Labs   Lab 06/23/25  1739   WBC 8.62   HGB 12.9   HCT 39.7      MCV 86.7   RDW 12.3      K 3.8   *   CO2 22   BUN 10.5   CREATININE 0.83   GLU 91   CALCIUM 9.2   PROT 7.6   ALBUMIN 4.2   BILITOT 0.6   AST 13   ALT 11   ALKPHOS 60     Recent Results (from the past 24 hours)   Comprehensive metabolic panel    Collection Time: 06/23/25  5:39 PM   Result Value Ref Range    Sodium 141 136 - 145 mmol/L    Potassium 3.8 3.5 - 5.1 mmol/L    Chloride 109 (H) 98 - 107 mmol/L    CO2 22 22 - 29 mmol/L    Glucose 91 74 - 100 mg/dL    Blood Urea Nitrogen 10.5 7.0 - 18.7 mg/dL    Creatinine 0.83 0.55 - 1.02 mg/dL    Calcium 9.2 8.4 - 10.2 mg/dL    Protein Total 7.6 6.4 - 8.3 gm/dL    Albumin 4.2 3.5 - 5.0 g/dL    Globulin 3.4 2.4 - 3.5 gm/dL    Albumin/Globulin Ratio 1.2 1.1 - 2.0 ratio    Bilirubin Total 0.6 <=1.5 mg/dL    ALP 60 40 - 150 unit/L    ALT 11 0 - 55 unit/L    AST 13 11 - 45 unit/L    eGFR >60 mL/min/1.73/m2    Anion Gap  10.0 mEq/L    BUN/Creatinine Ratio 13    Lipase    Collection Time: 06/23/25  5:39 PM   Result Value Ref Range    Lipase Level 24 <=60 U/L   hCG, quantitative, pregnancy    Collection Time: 06/23/25  5:39 PM   Result Value Ref Range    Beta HCG Quant 1,228.23 (H) <=5.00 mIU/mL   CBC with Differential    Collection Time: 06/23/25  5:39 PM   Result Value Ref Range    WBC 8.62 4.50 - 11.50 x10(3)/mcL    RBC 4.58 4.20 - 5.40 x10(6)/mcL    Hgb 12.9 12.0 - 16.0 g/dL    Hct 39.7 37.0 - 47.0 %    MCV 86.7 80.0 - 94.0 fL    MCH 28.2 27.0 - 31.0 pg    MCHC 32.5 (L) 33.0 - 36.0 g/dL    RDW 12.3 11.5 - 17.0 %    Platelet 313 130 - 400 x10(3)/mcL    MPV 9.4 7.4 - 10.4 fL    Neut % 70.5 %    Lymph % 21.2 %    Mono % 6.8 %    Eos % 0.7 %    Basophil % 0.6 %    Imm Grans % 0.2 %    Neut # 6.07 2.1 - 9.2 x10(3)/mcL    Lymph # 1.83 0.6 - 4.6 x10(3)/mcL    Mono # 0.59 0.1 - 1.3 x10(3)/mcL    Eos # 0.06 0 - 0.9 x10(3)/mcL    Baso # 0.05 <=0.2 x10(3)/mcL    Imm Gran # 0.02 0.00 - 0.04 x10(3)/mcL    NRBC% 0.0 %   Urinalysis, Reflex to Urine Culture Urine, Clean Catch    Collection Time: 06/23/25  5:40 PM    Specimen: Urine   Result Value Ref Range    Color, UA Yellow Yellow, Light-Yellow, Dark Yellow, Janie, Straw    Appearance, UA Turbid (A) Clear    Specific Gravity, UA 1.031 (H) 1.005 - 1.030    pH, UA 6.0 5.0 - 8.5    Protein, UA Trace (A) Negative    Glucose, UA Normal Negative, Normal    Ketones, UA Negative Negative    Blood, UA Negative Negative    Bilirubin, UA Negative Negative    Urobilinogen, UA Normal 0.2, 1.0, Normal    Nitrites, UA Negative Negative    Leukocyte Esterase,  (A) Negative    RBC, UA 6-10 (A) None Seen, 0-2, 3-5, 0-5 /HPF    WBC, UA 11-20 (A) None Seen, 0-2, 3-5, 0-5 /HPF    Bacteria, UA None Seen None Seen /HPF    Squamous Epithelial Cells, UA Many (A) None Seen /HPF    Mucous, UA Occasional (A) None Seen /LPF    Hyaline Casts, UA None Seen None Seen /lpf   POCT urine pregnancy    Collection Time:  06/23/25  5:41 PM   Result Value Ref Range    POC Preg Test, Ur Positive (A) Negative     Acceptable Yes    Light Blue Top Hold    Collection Time: 06/23/25  5:43 PM   Result Value Ref Range    Extra Tube Hold for add-ons.    Red Top Hold    Collection Time: 06/23/25  5:43 PM   Result Value Ref Range    Extra Tube Hold for add-ons.    Gold Top Hold    Collection Time: 06/23/25  5:43 PM   Result Value Ref Range    Extra Tube Hold for add-ons.      IMAGING  TVUS today:   FINDINGS:  Uterus: The uterus measures 7.1 cm in sagittal dimension by 4.7 cm in transverse dimension by 4.8 cm in AP dimension. The endometrium appears homogenous and markedly thickened.  Intrauterine gestation: No intrauterine gestation is identified with no intrauterine gestational sac yolk sac or fetal pole identified on the submitted images.     Adnexa:  Right Ovary: There is a thick walled cystic structure measuring 4.8 x 5.2 x 3.9 cm with internal echoes, calcific components, heterogenous material, and ring like structure within the right ovary. This is suggestive of a large possibly atypical cyst with the possibility of a right adnexal infectious component not excluded.  Left Ovary: The left ovary measures 3.6 cm by 2.4 cm by 2.7 cm. A large likely corpus luteum cyst is seen which measures 2.4 x 2.1 x 2.1 cm.     Fluid: Minimal free fluid is seen in the cul de sac.     Notification: The results were discussed prior to dictation referring resident OB physician at 2025-06-23 21:22:59 CDT.        Impression:  1. No intrauterine gestation is identified with no intrauterine gestational sac yolk sac or fetal pole identified on the submitted images.  2. There is a thick walled cystic structure measuring 4.8 x 5.2 x 3.9 cm with internal echoes, calcific components, heterogenous material, and ring like structure within the right ovary. This is suggestive of a large possibly atypical cyst with the possibility of a right adnexal infectious  component not excluded.  3. Recommend continued serial clinical laboratory and ultrasound follow-up.     Assessment:     Louisa Bower is a 19 y.o. female with PUL and adnexal mass suspicious for hydro vs pyosalpinx. Pt is currently hemodynamically and clinically stable.    [facility] consulted for assistance with PUL and adnexal mass.      Recommendations:     PUL:   - Beta hcg today 1228  - Patient without clinical signs of ectopic, only mild diffuse lower abdominal tenderness.   - TVUS images notable for thickened endometrium 12mm, no findings consistent with ectopic pregnancy  - Given below discriminatory zone, recommend repeat beta hcg in 48 hrs to evaluate for appropriate rise and further assess location of pregnancy as needed.   - Cannot rule out ectopic pregnancy, low suspicion at this time. Patient given strict return precautions.     Adnexal masses:   - Left adnexal mass consistent with corpus luteum  - Right adnexal mass- thick walled cystic structure measuring 4.8 x 5.2 x 3.9 cm. Possible hydro vs pyo salpinx.  - Pt recently with tx for GC/CT, imaging and exam not concerning for TOA.     Will follow up repeat beta hcg on Wednesday. Will determine follow up and further imaging to assess adnexal masses pending repeat beta hcg.     Discussed with staff, Dr. Nunez.    Araceli Dotson, DO  [facility] PGY-4          [1]   Social History  Tobacco Use    Smoking status: Never    Smokeless tobacco: Never   Vaping Use    Vaping status: Every Day   Substance Use Topics    Alcohol use: Not Currently    Drug use: Never   [2] (Not in a hospital admission)   [3] No current facility-administered medications for this encounter.    Current Outpatient Medications:     naproxen (NAPROSYN) 500 MG tablet, Take 1 tablet (500 mg total) by mouth 2 (two) times daily as needed., Disp: 20 tablet, Rfl: 1

## 2025-06-25 ENCOUNTER — PATIENT MESSAGE (OUTPATIENT)
Dept: GYNECOLOGY | Facility: CLINIC | Age: 20
End: 2025-06-25
Payer: MEDICAID

## 2025-06-25 ENCOUNTER — HOSPITAL ENCOUNTER (EMERGENCY)
Facility: HOSPITAL | Age: 20
Discharge: HOME | End: 2025-06-25
Attending: EMERGENCY MEDICINE
Payer: MEDICAID

## 2025-06-25 VITALS
OXYGEN SATURATION: 99 % | SYSTOLIC BLOOD PRESSURE: 153 MMHG | HEART RATE: 68 BPM | WEIGHT: 260 LBS | TEMPERATURE: 98 F | RESPIRATION RATE: 18 BRPM | DIASTOLIC BLOOD PRESSURE: 92 MMHG | HEIGHT: 65 IN | BODY MASS INDEX: 43.32 KG/M2

## 2025-06-25 DIAGNOSIS — O36.80X0 PREGNANCY OF UNKNOWN ANATOMIC LOCATION: Primary | ICD-10-CM

## 2025-06-25 LAB
B-HCG FREE SERPL-ACNC: 2386.1 MIU/ML
HOLD SPECIMEN: NORMAL

## 2025-06-25 PROCEDURE — 99283 EMERGENCY DEPT VISIT LOW MDM: CPT

## 2025-06-25 PROCEDURE — 84702 CHORIONIC GONADOTROPIN TEST: CPT | Performed by: NURSE PRACTITIONER

## 2025-06-25 NOTE — ED PROVIDER NOTES
Encounter Date: 2025       History     Chief Complaint   Patient presents with    HCG REDRAW     Pt reports was told to come back for HCG redraw. Reports continued midline pelvic cramps x1 week. Denies vaginal bleeding.      Patient is a 19-year-old female who presents to the emergency room for a recheck of her beta HCG level.  Says she was seen in the department 2 days ago and had an ultrasound and blood work performed.  She reports being currently pregnant.  The patient is a .  Admits to pelvic pain and vaginal spotting at initial emergency room visit.  Denies bleeding at this time but admits to minimal continued cramping.  Denies chest pain, shortness of breath, fever, nausea/vomiting, or loss of bowel or bladder control.      Review of patient's allergies indicates:  No Known Allergies  Past Medical History:   Diagnosis Date    Known health problems: none      Past Surgical History:   Procedure Laterality Date    none       No family history on file.  Social History[1]  Review of Systems   Constitutional:  Negative for chills, diaphoresis, fatigue and fever.   HENT:  Negative for facial swelling, rhinorrhea, sinus pressure, sinus pain, sore throat and trouble swallowing.    Respiratory:  Negative for cough, chest tightness, shortness of breath and wheezing.    Cardiovascular:  Negative for chest pain, palpitations and leg swelling.   Gastrointestinal:  Negative for abdominal pain, diarrhea, nausea and vomiting.   Genitourinary:  Positive for pelvic pain. Negative for dysuria, flank pain, frequency, hematuria, urgency, vaginal bleeding and vaginal discharge.   Musculoskeletal:  Negative for arthralgias, back pain, joint swelling and myalgias.   Skin:  Negative for color change and rash.   Neurological:  Negative for dizziness, syncope, weakness and light-headedness.   Hematological:  Does not bruise/bleed easily.   All other systems reviewed and are negative.      Physical Exam     Initial Vitals  [06/25/25 0933]   BP Pulse Resp Temp SpO2   139/85 78 18 98.1 °F (36.7 °C) 100 %      MAP       --         Physical Exam    Nursing note and vitals reviewed.  Constitutional: She appears well-developed and well-nourished.   HENT:   Head: Normocephalic and atraumatic.   Nose: Nose normal. Mouth/Throat: Oropharynx is clear and moist.   Eyes: Conjunctivae and EOM are normal. Pupils are equal, round, and reactive to light.   Neck: Neck supple.   Normal range of motion.  Cardiovascular:  Normal rate, regular rhythm, normal heart sounds and intact distal pulses.           Pulmonary/Chest: Effort normal and breath sounds normal. No respiratory distress. She has no wheezes. She has no rhonchi. She has no rales. She exhibits no tenderness.   Abdominal: Abdomen is soft and flat. Bowel sounds are normal. She exhibits no distension. There is no abdominal tenderness. There is no rebound, no guarding, no tenderness at McBurney's point and negative Yi's sign. negative psoas sign  Musculoskeletal:         General: Normal range of motion.      Cervical back: Normal range of motion and neck supple.     Neurological: She is alert and oriented to person, place, and time. She has normal strength and normal reflexes.   Skin: Skin is warm and dry. Capillary refill takes less than 2 seconds.   Psychiatric: She has a normal mood and affect. Her speech is normal and behavior is normal. Judgment and thought content normal.         ED Course   Procedures  Labs Reviewed   HCG, QUANTITATIVE - Abnormal       Result Value    Beta HCG Quant 2,386.10 (*)    EXTRA TUBES    Narrative:     The following orders were created for panel order EXTRA TUBES.  Procedure                               Abnormality         Status                     ---------                               -----------         ------                     Red Top Hold[[id number]]                                    In process                 Lavender Top Hold[[id number]]                                In process                 Gold Top Hold[[id number]]                                   In process                   Please view results for these tests on the individual orders.   RED TOP HOLD   LAVENDER TOP HOLD   GOLD TOP HOLD          Imaging Results    None          Medications - No data to display  Medical Decision Making  Differential:   Pregnancy of unknown location   Threatened     Amount and/or Complexity of Data Reviewed  Labs: ordered.               ED Course as of 25 1201   Wed 2025   115 Discussed in detail with the patient that continued elevation of her beta HCG level is reassuring; however, she still needs to follow up with gyn services as previously discussed.  She has also had to present to the nearest emergency room immediately if she develops worsening pelvic pain or acute vaginal bleeding.  The patient has verbalized understanding and agreement.  Patient will be discharged at this time. [JA]      ED Course User Index  [JA] Anthony Vela Jr., DNP                       This chart is generated using a voice recognition system. Grammatical and content areas may inadvertently be generated in error. Please contact me if you find a perceive any inappropriate information in this chart. Thank you.       Clinical Impression:  Final diagnoses:  [O36.80X0] Pregnancy of unknown anatomic location (Primary)          ED Disposition Condition    Discharge Stable          ED Prescriptions    None       Follow-up Information       Follow up With Specialties Details Why Contact Info    Kat Lomeli MD Family Medicine In 3 days  [address] [facility] [address] [phone]      [facility] Emergency Medicine In 3 days As needed, If symptoms worsen [address] [facility] [address] [phone]                   [1]   Social History  Tobacco Use    Smoking status: Never    Smokeless tobacco: Never   Vaping Use     Vaping status: Every Day   Substance Use Topics    Alcohol use: Not Currently    Drug use: Never        Anthony Vela Jr., [facility]  06/25/25 8374

## 2025-06-25 NOTE — DISCHARGE INSTRUCTIONS
Keep planned follow up with gyn services as discussed.  Present to the nearest emergency room immediately if you develop acute pelvic pain or vaginal bleeding.

## 2025-06-26 LAB — BACTERIA UR CULT: NORMAL

## 2025-07-01 ENCOUNTER — HOSPITAL ENCOUNTER (EMERGENCY)
Facility: HOSPITAL | Age: 20
Discharge: HOME | End: 2025-07-01
Attending: INTERNAL MEDICINE
Payer: MEDICAID

## 2025-07-01 VITALS
RESPIRATION RATE: 20 BRPM | HEART RATE: 69 BPM | BODY MASS INDEX: 43.68 KG/M2 | OXYGEN SATURATION: 100 % | TEMPERATURE: 98 F | DIASTOLIC BLOOD PRESSURE: 71 MMHG | SYSTOLIC BLOOD PRESSURE: 136 MMHG | HEIGHT: 65 IN | WEIGHT: 262.19 LBS

## 2025-07-01 DIAGNOSIS — O46.90 VAGINAL BLEEDING IN PREGNANCY: Primary | ICD-10-CM

## 2025-07-01 LAB
B-HCG FREE SERPL-ACNC: ABNORMAL MIU/ML
BACTERIA #/AREA URNS AUTO: ABNORMAL /HPF
BASOPHILS # BLD AUTO: 0.04 X10(3)/MCL
BASOPHILS NFR BLD AUTO: 0.4 %
BILIRUB UR QL STRIP.AUTO: NEGATIVE
CLARITY UR: ABNORMAL
COLOR UR AUTO: YELLOW
EOSINOPHIL # BLD AUTO: 0.07 X10(3)/MCL (ref 0–0.9)
EOSINOPHIL NFR BLD AUTO: 0.7 %
ERYTHROCYTE [DISTWIDTH] IN BLOOD BY AUTOMATED COUNT: 12.3 % (ref 11.5–17)
GLUCOSE UR QL STRIP: NORMAL
HCT VFR BLD AUTO: 34 % (ref 37–47)
HGB BLD-MCNC: 11.1 G/DL (ref 12–16)
HGB UR QL STRIP: ABNORMAL
HOLD SPECIMEN: NORMAL
HYALINE CASTS #/AREA URNS LPF: ABNORMAL /LPF
IMM GRANULOCYTES # BLD AUTO: 0.03 X10(3)/MCL (ref 0–0.04)
IMM GRANULOCYTES NFR BLD AUTO: 0.3 %
KETONES UR QL STRIP: ABNORMAL
LEUKOCYTE ESTERASE UR QL STRIP: 500
LYMPHOCYTES # BLD AUTO: 3.04 X10(3)/MCL (ref 0.6–4.6)
LYMPHOCYTES NFR BLD AUTO: 28.5 %
MCH RBC QN AUTO: 28.4 PG (ref 27–31)
MCHC RBC AUTO-ENTMCNC: 32.6 G/DL (ref 33–36)
MCV RBC AUTO: 87 FL (ref 80–94)
MONOCYTES # BLD AUTO: 0.57 X10(3)/MCL (ref 0.1–1.3)
MONOCYTES NFR BLD AUTO: 5.3 %
MUCOUS THREADS URNS QL MICRO: ABNORMAL /LPF
NEUTROPHILS # BLD AUTO: 6.91 X10(3)/MCL (ref 2.1–9.2)
NEUTROPHILS NFR BLD AUTO: 64.8 %
NITRITE UR QL STRIP: NEGATIVE
NRBC BLD AUTO-RTO: 0 %
PH UR STRIP: 6 [PH]
PLATELET # BLD AUTO: 305 X10(3)/MCL (ref 130–400)
PMV BLD AUTO: 9.6 FL (ref 7.4–10.4)
PROT UR QL STRIP: ABNORMAL
RBC # BLD AUTO: 3.91 X10(6)/MCL (ref 4.2–5.4)
RBC #/AREA URNS AUTO: ABNORMAL /HPF
SP GR UR STRIP.AUTO: 1.04 (ref 1–1.03)
SQUAMOUS #/AREA URNS LPF: ABNORMAL /HPF
UROBILINOGEN UR STRIP-ACNC: NORMAL
WBC # BLD AUTO: 10.66 X10(3)/MCL (ref 4.5–11.5)
WBC #/AREA URNS AUTO: ABNORMAL /HPF

## 2025-07-01 PROCEDURE — 81001 URINALYSIS AUTO W/SCOPE: CPT | Performed by: PHYSICIAN ASSISTANT

## 2025-07-01 PROCEDURE — 87591 N.GONORRHOEAE DNA AMP PROB: CPT | Performed by: PHYSICIAN ASSISTANT

## 2025-07-01 PROCEDURE — 87086 URINE CULTURE/COLONY COUNT: CPT | Performed by: PHYSICIAN ASSISTANT

## 2025-07-01 PROCEDURE — 85025 COMPLETE CBC W/AUTO DIFF WBC: CPT | Performed by: PHYSICIAN ASSISTANT

## 2025-07-01 PROCEDURE — 84702 CHORIONIC GONADOTROPIN TEST: CPT | Performed by: PHYSICIAN ASSISTANT

## 2025-07-01 PROCEDURE — 99284 EMERGENCY DEPT VISIT MOD MDM: CPT

## 2025-07-02 LAB
C TRACH DNA SPEC QL NAA+PROBE: NOT DETECTED
N GONORRHOEA DNA SPEC QL NAA+PROBE: NOT DETECTED
SPECIMEN SOURCE: NORMAL

## 2025-07-02 NOTE — ED PROVIDER NOTES
Encounter Date: 2025       History     Chief Complaint   Patient presents with    Vaginal Bleeding     Pt currently 6 weeks pregnant and started with vaginal bleeding 20min ago. Reports minor bleeding, no bloodclots, with mild cramping. Was seen here last week for abd cramping, Ultrasound and HCG levels were done.      19-year-old female, , approximately 6 weeks pregnant, presents to the emergency department with reports of very mild vaginal bleeding (light pink) that began about 20 minutes prior to arrival.  She states she has mild cramping in her suprapubic region however however denies dysuria.  She was recently treated for GC on 2025.  She also was treated for UTI with Macrobid on 2025 however urine culture was negative.  At the time gyn was consulted due to pregnancy for unknown location and right cystic structure in adnexa.  Patient was told to follow up with her OBGYN later this month as planned.  She rates her pain 2/10.  She denies vaginal discharge.    The history is provided by the patient. No  was used.     Review of patient's allergies indicates:  No Known Allergies  Past Medical History:   Diagnosis Date    Known health problems: none      Past Surgical History:   Procedure Laterality Date    none       No family history on file.  Social History[1]  Review of Systems   Gastrointestinal:  Positive for abdominal pain.   Genitourinary:  Positive for vaginal bleeding.   Musculoskeletal:  Positive for back pain.       Physical Exam     Initial Vitals [25 1846]   BP Pulse Resp Temp SpO2   136/71 69 20 98.2 °F (36.8 °C) 100 %      MAP       --         Physical Exam    Nursing note and vitals reviewed.  Constitutional: She appears well-developed and well-nourished.   HENT:   Head: Normocephalic and atraumatic.   Cardiovascular:  Normal rate, normal heart sounds and intact distal pulses.           Pulmonary/Chest: Breath sounds normal.   Abdominal: Abdomen is soft.  Bowel sounds are normal. There is no abdominal tenderness. There is no rebound and no guarding.   Musculoskeletal:         General: Normal range of motion.     Neurological: She is alert.         ED Course   ED US Pelvis OB    Date/Time: 7/1/2025 9:16 PM    Performed by: Gregor Landis MD  Authorized by: Gregor Landis MD    Indication:  Pregnancy, Vaginal bleeding and Abdominal pain  Identified Structures:  Left adnexa, Right adnexa, Uterus transverse and Uterus sagittal  Definitive IUP:  Present  Uterine Contents:  Yolk sac  Free fluid in cul-de-sac:  Absent   absent  Right adnexa: 3.7 cm- 4.2 cm  cystic structure apprecaited.  Other:  Estimated 6 weeks ,2 days  Impression:  IUP  Charge?:  No (educational) (Performed by Dr. Hernández)    Labs Reviewed   HCG, QUANTITATIVE - Abnormal       Result Value    Beta HCG Quant 22,099.98 (*)    CBC WITH DIFFERENTIAL - Abnormal    WBC 10.66      RBC 3.91 (*)     Hgb 11.1 (*)     Hct 34.0 (*)     MCV 87.0      MCH 28.4      MCHC 32.6 (*)     RDW 12.3      Platelet 305      MPV 9.6      Neut % 64.8      Lymph % 28.5      Mono % 5.3      Eos % 0.7      Basophil % 0.4      Imm Grans % 0.3      Neut # 6.91      Lymph # 3.04      Mono # 0.57      Eos # 0.07      Baso # 0.04      Imm Gran # 0.03      NRBC% 0.0     URINALYSIS, REFLEX TO URINE CULTURE - Abnormal    Color, UA Yellow      Appearance, UA Turbid (*)     Specific Gravity, UA 1.036 (*)     pH, UA 6.0      Protein, UA Trace (*)     Glucose, UA Normal      Ketones, UA Trace (*)     Blood, UA 3+ (*)     Bilirubin, UA Negative      Urobilinogen, UA Normal      Nitrites, UA Negative      Leukocyte Esterase,  (*)     RBC, UA 0-5      WBC, UA 11-20 (*)     Bacteria, UA Trace (*)     Squamous Epithelial Cells, UA Moderate (*)     Mucous, UA Moderate (*)     Hyaline Casts, UA None Seen     CHLAMYDIA/GONORRHOEAE(GC), PCR   CULTURE, URINE   CBC W/ AUTO DIFFERENTIAL    Narrative:     The following  orders were created for panel order CBC Auto Differential.  Procedure                               Abnormality         Status                     ---------                               -----------         ------                     CBC with Differential[[id number]]       Abnormal            Final result                 Please view results for these tests on the individual orders.   EXTRA TUBES    Narrative:     The following orders were created for panel order EXTRA TUBES.  Procedure                               Abnormality         Status                     ---------                               -----------         ------                     Light Blue Top Hold[[id number]]                             Final result               Light Green Top Hold[[id number]]                            Final result               Gold Top Hold[[id number]]                                   Final result               Pink Top Hold[[id number]]                                   Final result                 Please view results for these tests on the individual orders.   LIGHT BLUE TOP HOLD    Extra Tube Hold for add-ons.     LIGHT GREEN TOP HOLD    Extra Tube Hold for add-ons.     GOLD TOP HOLD    Extra Tube Hold for add-ons.     PINK TOP HOLD    Extra Tube Hold for add-ons.            Imaging Results    None          Medications - No data to display  Medical Decision Making  19-year-old female, , approximately 6 weeks pregnant, presents to the emergency department with reports of very mild vaginal bleeding (light pink) that began about 20 minutes prior to arrival.  She states she has mild cramping in her suprapubic region however however denies dysuria.  She was recently treated for GC on 2025.  She also was treated for UTI with Macrobid on 2025 however urine culture was negative.  At the time gyn was consulted due to pregnancy for unknown location and right cystic structure in adnexa.  Patient was told to follow up  with her OBGYN later this month as planned.  She rates her pain 2/10.  She denies vaginal discharge.    DDx:  Vaginal bleeding in early pregnancy, threatened miscarriage, STI, hydrosalpinx    Beta quant significantly improved, now 22,099.   Urinalysis shows possible infection however recent culture negative and she was recently treated with Macrobid.  Will wait for urine culture.  Yolk sac identified in the uterus.  No tenderness on abdominal exam.  She will follow up with her OBGYN later this month as planned return with any concerning symptoms.    Amount and/or Complexity of Data Reviewed  Labs: ordered. Decision-making details documented in ED Course.               ED Course as of 07/01/25 2218 Tue Jul 01, 2025 1949 Beta HCG Quant(!): 22,099.98 [ER]   2114 Urinalysis, Reflex to Urine Culture Urine, Clean Catch(!)  Recent urine culture negative.  Possible contamination with moderate squamous cells and blood.  Will wait for culture. [ER]   2116 The patient is resting comfortably and in no acute distress.   I personally discussed her test results and treatment plan.  Gave strict ED precautions, discussed specific conditions for return to the emergency department and importance of follow up with her primary care provider.  Patient voices understanding and agrees to the plan discussed. All patients' questions have been answered at this time.   She has remained hemodynamically stable throughout entire stay in ED and is stable for discharge home.  [ER]      ED Course User Index  [ER] Ruma Mixon PA                           Clinical Impression:  Final diagnoses:  [O46.90] Vaginal bleeding in pregnancy (Primary)          ED Disposition Condition    Discharge Stable          ED Prescriptions    None       Follow-up Information       Follow up With Specialties Details Why Contact Info    [facility] Emergency Medicine  As needed, If symptoms worsen [address] [facility]  [address]  [phone]                   [1]   Social History  Tobacco Use    Smoking status: Never    Smokeless tobacco: Never   Vaping Use    Vaping status: Former   Substance Use Topics    Alcohol use: Not Currently    Drug use: Never        Ruma Mixon PA  07/01/25 2211

## 2025-07-02 NOTE — DISCHARGE INSTRUCTIONS
Take prenatal vitamins daily.  Return immediately if symptoms worsen.  Follow up with your OBGYN this month as planned.

## 2025-07-03 ENCOUNTER — RESULTS FOLLOW-UP (OUTPATIENT)
Dept: EMERGENCY MEDICINE | Facility: HOSPITAL | Age: 20
End: 2025-07-03

## 2025-07-03 LAB — BACTERIA UR CULT: NO GROWTH

## 2025-08-10 ENCOUNTER — HOSPITAL ENCOUNTER (EMERGENCY)
Facility: HOSPITAL | Age: 20
Discharge: HOME | End: 2025-08-11
Attending: EMERGENCY MEDICINE
Payer: MEDICAID

## 2025-08-10 DIAGNOSIS — Z34.90 INTRAUTERINE PREGNANCY: ICD-10-CM

## 2025-08-10 DIAGNOSIS — N39.0 ACUTE LOWER UTI: Primary | ICD-10-CM

## 2025-08-10 DIAGNOSIS — M54.9 BACK PAIN, UNSPECIFIED BACK LOCATION, UNSPECIFIED BACK PAIN LATERALITY, UNSPECIFIED CHRONICITY: ICD-10-CM

## 2025-08-10 LAB
ABSOLUTE EOSINOPHIL (OHS): 0.01 K/UL
ABSOLUTE MONOCYTE (OHS): 0.31 K/UL (ref 0.3–1)
ABSOLUTE NEUTROPHIL COUNT (OHS): 6.95 K/UL (ref 1.8–7.7)
ALBUMIN SERPL BCP-MCNC: 3.8 G/DL (ref 3.5–5.2)
ALP SERPL-CCNC: 48 UNIT/L (ref 40–150)
ALT SERPL W/O P-5'-P-CCNC: 11 UNIT/L (ref 10–44)
ANION GAP (OHS): 12 MMOL/L (ref 8–16)
AST SERPL-CCNC: 16 UNIT/L (ref 11–45)
BACTERIA #/AREA URNS AUTO: ABNORMAL /HPF
BASOPHILS # BLD AUTO: 0.02 K/UL
BASOPHILS NFR BLD AUTO: 0.3 %
BILIRUB SERPL-MCNC: 0.7 MG/DL (ref 0.1–1)
BILIRUB UR QL STRIP.AUTO: NEGATIVE
BUN SERPL-MCNC: 4 MG/DL (ref 6–20)
CALCIUM SERPL-MCNC: 9.1 MG/DL (ref 8.7–10.5)
CHLORIDE SERPL-SCNC: 103 MMOL/L (ref 95–110)
CLARITY UR: ABNORMAL
CO2 SERPL-SCNC: 19 MMOL/L (ref 23–29)
COLOR UR AUTO: YELLOW
CREAT SERPL-MCNC: 0.6 MG/DL (ref 0.5–1.4)
ERYTHROCYTE [DISTWIDTH] IN BLOOD BY AUTOMATED COUNT: 12.7 % (ref 11.5–14.5)
GFR SERPLBLD CREATININE-BSD FMLA CKD-EPI: >60 ML/MIN/1.73/M2
GLUCOSE SERPL-MCNC: 95 MG/DL (ref 70–110)
GLUCOSE UR QL STRIP: NEGATIVE
HCG INTACT+B SERPL-ACNC: NORMAL MIU/ML
HCT VFR BLD AUTO: 34 % (ref 37–48.5)
HGB BLD-MCNC: 11.5 GM/DL (ref 12–16)
HGB UR QL STRIP: ABNORMAL
IMM GRANULOCYTES # BLD AUTO: 0.03 K/UL (ref 0–0.04)
IMM GRANULOCYTES NFR BLD AUTO: 0.4 % (ref 0–0.5)
KETONES UR QL STRIP: ABNORMAL
LEUKOCYTE ESTERASE UR QL STRIP: ABNORMAL
LYMPHOCYTES # BLD AUTO: 0.21 K/UL (ref 1–4.8)
MCH RBC QN AUTO: 28.8 PG (ref 27–31)
MCHC RBC AUTO-ENTMCNC: 33.8 G/DL (ref 32–36)
MCV RBC AUTO: 85 FL (ref 82–98)
MICROSCOPIC COMMENT: ABNORMAL
NITRITE UR QL STRIP: NEGATIVE
NUCLEATED RBC (/100WBC) (OHS): 0 /100 WBC
PH UR STRIP: 7 [PH]
PLATELET # BLD AUTO: 230 K/UL (ref 150–450)
PMV BLD AUTO: 9 FL (ref 9.2–12.9)
POTASSIUM SERPL-SCNC: 3.3 MMOL/L (ref 3.5–5.1)
PROT SERPL-MCNC: 6.9 GM/DL (ref 6–8.4)
PROT UR QL STRIP: ABNORMAL
RBC # BLD AUTO: 3.99 M/UL (ref 4–5.4)
RBC #/AREA URNS AUTO: 5 /HPF (ref 0–4)
RELATIVE EOSINOPHIL (OHS): 0.1 %
RELATIVE LYMPHOCYTE (OHS): 2.8 % (ref 18–48)
RELATIVE MONOCYTE (OHS): 4.1 % (ref 4–15)
RELATIVE NEUTROPHIL (OHS): 92.3 % (ref 38–73)
SODIUM SERPL-SCNC: 134 MMOL/L (ref 136–145)
SP GR UR STRIP: 1.02
SQUAMOUS #/AREA URNS AUTO: 27 /HPF
UNSPECIFIED CRY UR QL COMP ASSIST: ABNORMAL
UROBILINOGEN UR STRIP-ACNC: NEGATIVE EU/DL
WBC # BLD AUTO: 7.53 K/UL (ref 3.9–12.7)
WBC #/AREA URNS AUTO: 75 /HPF (ref 0–5)

## 2025-08-10 PROCEDURE — 99284 EMERGENCY DEPT VISIT MOD MDM: CPT | Mod: 25

## 2025-08-10 PROCEDURE — 84702 CHORIONIC GONADOTROPIN TEST: CPT | Performed by: NURSE PRACTITIONER

## 2025-08-10 PROCEDURE — 81003 URINALYSIS AUTO W/O SCOPE: CPT | Performed by: NURSE PRACTITIONER

## 2025-08-10 PROCEDURE — 85025 COMPLETE CBC W/AUTO DIFF WBC: CPT | Performed by: NURSE PRACTITIONER

## 2025-08-10 PROCEDURE — 87210 SMEAR WET MOUNT SALINE/INK: CPT

## 2025-08-10 PROCEDURE — 96360 HYDRATION IV INFUSION INIT: CPT

## 2025-08-10 PROCEDURE — 80053 COMPREHEN METABOLIC PANEL: CPT | Performed by: NURSE PRACTITIONER

## 2025-08-10 PROCEDURE — 87086 URINE CULTURE/COLONY COUNT: CPT | Performed by: NURSE PRACTITIONER

## 2025-08-10 PROCEDURE — 87591 N.GONORRHOEAE DNA AMP PROB: CPT

## 2025-08-10 PROCEDURE — 25000003 PHARM REV CODE 250

## 2025-08-10 PROCEDURE — 96361 HYDRATE IV INFUSION ADD-ON: CPT

## 2025-08-10 RX ORDER — ACETAMINOPHEN 325 MG/1
650 TABLET ORAL
Status: COMPLETED | OUTPATIENT
Start: 2025-08-10 | End: 2025-08-10

## 2025-08-10 RX ORDER — SODIUM CHLORIDE 9 MG/ML
1000 INJECTION, SOLUTION INTRAVENOUS
Status: COMPLETED | OUTPATIENT
Start: 2025-08-10 | End: 2025-08-10

## 2025-08-10 RX ORDER — CEPHALEXIN 500 MG/1
500 CAPSULE ORAL
Status: COMPLETED | OUTPATIENT
Start: 2025-08-10 | End: 2025-08-10

## 2025-08-10 RX ADMIN — ACETAMINOPHEN 650 MG: 325 TABLET ORAL at 09:08

## 2025-08-10 RX ADMIN — CEPHALEXIN 500 MG: 500 CAPSULE ORAL at 11:08

## 2025-08-10 RX ADMIN — SODIUM CHLORIDE 1000 ML: 9 INJECTION, SOLUTION INTRAVENOUS at 09:08

## 2025-08-11 VITALS
HEART RATE: 90 BPM | TEMPERATURE: 99 F | SYSTOLIC BLOOD PRESSURE: 130 MMHG | DIASTOLIC BLOOD PRESSURE: 66 MMHG | OXYGEN SATURATION: 99 % | RESPIRATION RATE: 17 BRPM

## 2025-08-11 LAB
BACTERIA GENITAL QL WET PREP: ABNORMAL
CLUE CELLS VAG QL WET PREP: ABNORMAL
FILAMENT FUNGI VAG WET PREP-#/AREA: ABNORMAL
HOLD SPECIMEN: NORMAL
T VAGINALIS GENITAL QL WET PREP: ABNORMAL
WBC #/AREA VAG WET PREP: ABNORMAL
YEAST GENITAL QL WET PREP: ABNORMAL

## 2025-08-11 RX ORDER — DEXTROMETHORPHAN HYDROBROMIDE, GUAIFENESIN 5; 100 MG/5ML; MG/5ML
650 LIQUID ORAL EVERY 8 HOURS
Qty: 30 TABLET | Refills: 0 | Status: SHIPPED | OUTPATIENT
Start: 2025-08-11

## 2025-08-11 RX ORDER — ACETAMINOPHEN 500 MG
500 TABLET ORAL
Status: DISCONTINUED | OUTPATIENT
Start: 2025-08-11 | End: 2025-08-11 | Stop reason: HOSPADM

## 2025-08-11 RX ORDER — CEPHALEXIN 500 MG/1
500 CAPSULE ORAL 4 TIMES DAILY
Qty: 20 CAPSULE | Refills: 0 | Status: SHIPPED | OUTPATIENT
Start: 2025-08-11 | End: 2025-08-16

## 2025-08-12 LAB — BACTERIA UR CULT: NORMAL

## 2025-08-13 LAB
C TRACH DNA SPEC QL NAA+PROBE: NOT DETECTED
CTGC SOURCE (OHS) ORD-325: NORMAL
N GONORRHOEA DNA UR QL NAA+PROBE: NOT DETECTED